# Patient Record
Sex: MALE | Race: WHITE | NOT HISPANIC OR LATINO | Employment: FULL TIME | URBAN - METROPOLITAN AREA
[De-identification: names, ages, dates, MRNs, and addresses within clinical notes are randomized per-mention and may not be internally consistent; named-entity substitution may affect disease eponyms.]

---

## 2020-09-29 ENCOUNTER — OFFICE VISIT (OUTPATIENT)
Dept: URGENT CARE | Facility: CLINIC | Age: 45
End: 2020-09-29
Payer: COMMERCIAL

## 2020-09-29 ENCOUNTER — TELEPHONE (OUTPATIENT)
Dept: ORTHOPEDICS | Facility: CLINIC | Age: 45
End: 2020-09-29

## 2020-09-29 VITALS
DIASTOLIC BLOOD PRESSURE: 66 MMHG | WEIGHT: 200 LBS | SYSTOLIC BLOOD PRESSURE: 133 MMHG | RESPIRATION RATE: 16 BRPM | OXYGEN SATURATION: 98 % | HEART RATE: 63 BPM | HEIGHT: 72 IN | BODY MASS INDEX: 27.09 KG/M2 | TEMPERATURE: 99 F

## 2020-09-29 DIAGNOSIS — M79.671 RIGHT FOOT PAIN: Primary | ICD-10-CM

## 2020-09-29 PROCEDURE — 73630 XR FOOT COMPLETE 3 VIEW RIGHT: ICD-10-PCS | Mod: RT,S$GLB,, | Performed by: RADIOLOGY

## 2020-09-29 PROCEDURE — 99203 PR OFFICE/OUTPT VISIT, NEW, LEVL III, 30-44 MIN: ICD-10-PCS | Mod: S$GLB,,, | Performed by: PHYSICIAN ASSISTANT

## 2020-09-29 PROCEDURE — 73630 X-RAY EXAM OF FOOT: CPT | Mod: RT,S$GLB,, | Performed by: RADIOLOGY

## 2020-09-29 PROCEDURE — 99203 OFFICE O/P NEW LOW 30 MIN: CPT | Mod: S$GLB,,, | Performed by: PHYSICIAN ASSISTANT

## 2020-09-29 NOTE — PROGRESS NOTES
"Subjective:       Patient ID: Jose Brock is a 45 y.o. male.    Vitals:  height is 6' (1.829 m) and weight is 90.7 kg (200 lb). His oral temperature is 98.6 °F (37 °C). His blood pressure is 133/66 and his pulse is 63. His respiration is 16 and oxygen saturation is 98%.     Chief Complaint: Foot Pain    Patient presents to urgent care with bilateral foot pain x 1 month. Patient reports that he was playing volleyball in the sand and "thinks he came down on his feet wrong". Patient denies prior injury/trauma to R/L foot. Patient reports L foot pain has improved, but R foot is still bothering him. Patient currently denies numbness/tingling or weakness. Patient reports that he has not been taking it easy and still exercising - Cross Fit, jump rope, etc.     Foot Pain  This is a new problem. The current episode started more than 1 month ago. The problem occurs constantly. The problem has been unchanged. Associated symptoms include arthralgias. Pertinent negatives include no abdominal pain, anorexia, change in bowel habit, chest pain, chills, congestion, coughing, diaphoresis, fatigue, fever, headaches, joint swelling, myalgias, nausea, neck pain, numbness, rash, sore throat, swollen glands, urinary symptoms, vertigo, visual change, vomiting or weakness. The symptoms are aggravated by walking (palpation). He has tried nothing for the symptoms.       Constitution: Negative for chills, sweating, fatigue and fever.   HENT: Negative for ear pain, drooling, congestion, sore throat, trouble swallowing and voice change.    Neck: Negative for neck pain, neck stiffness, painful lymph nodes and neck swelling.   Cardiovascular: Negative for chest pain, leg swelling, palpitations, sob on exertion and passing out.   Eyes: Negative for eye discharge, eye itching, eye pain, eye redness, double vision, blurred vision and eyelid swelling.   Respiratory: Negative for chest tightness, cough, sputum production, bloody sputum, shortness of " breath, stridor and wheezing.    Gastrointestinal: Negative for abdominal pain, abdominal bloating, nausea, vomiting, constipation, diarrhea and heartburn.   Musculoskeletal: Positive for pain, trauma, joint pain and pain with walking. Negative for joint swelling, abnormal ROM of joint, back pain, muscle cramps and muscle ache.   Skin: Negative for rash, wound, abrasion, laceration, puncture wound, erythema, bruising and hives.   Allergic/Immunologic: Negative for seasonal allergies, hives, itching and sneezing.   Neurological: Negative for dizziness, history of vertigo, light-headedness, passing out, loss of balance, headaches, altered mental status, loss of consciousness, numbness, tingling and seizures.   Hematologic/Lymphatic: Negative for swollen lymph nodes.   Psychiatric/Behavioral: Negative for altered mental status and nervous/anxious. The patient is not nervous/anxious.        Objective:      Physical Exam   Constitutional: He is oriented to person, place, and time. He appears well-developed. He is cooperative.  Non-toxic appearance. He does not appear ill. No distress.   HENT:   Head: Normocephalic and atraumatic.   Ears:   Right Ear: Hearing, tympanic membrane, external ear and ear canal normal.   Left Ear: Hearing, tympanic membrane, external ear and ear canal normal.   Nose: Nose normal. No mucosal edema, rhinorrhea or nasal deformity. No epistaxis. Right sinus exhibits no maxillary sinus tenderness and no frontal sinus tenderness. Left sinus exhibits no maxillary sinus tenderness and no frontal sinus tenderness.   Mouth/Throat: Uvula is midline, oropharynx is clear and moist and mucous membranes are normal. No trismus in the jaw. Normal dentition. No uvula swelling. No oropharyngeal exudate, posterior oropharyngeal edema or posterior oropharyngeal erythema.   Eyes: Conjunctivae and lids are normal. No scleral icterus.   Neck: Trachea normal, full passive range of motion without pain and phonation  normal. Neck supple. No neck rigidity. No edema and no erythema present.   Cardiovascular: Normal rate, regular rhythm, normal heart sounds and normal pulses.   Pulmonary/Chest: Effort normal and breath sounds normal. No accessory muscle usage. No respiratory distress. He has no decreased breath sounds. He has no rhonchi.   Abdominal: Normal appearance.   Musculoskeletal: Normal range of motion.         General: No deformity.      Right foot: Normal range of motion and normal capillary refill. Tenderness and bony tenderness present. No swelling, crepitus, deformity or laceration.      Left foot: Normal.      Comments: FROM and pain with R foot dorsiflexion/plantarflexion/inversion/eversion. 5/5 strength and full sensation bilateral. TTP over proximal phalanx of 2nd toe. 2+ radial pulses bilateral. No pitting edema to bilateral lower extremities. No numbness or tingling. Able to ambulate without difficulty.   Lymphadenopathy:     He has no cervical adenopathy.   Neurological: He is alert and oriented to person, place, and time. He has normal sensation. He exhibits normal muscle tone. Gait normal. Coordination normal.   Skin: Skin is warm, dry, intact, not diaphoretic, not pale and no rash. Capillary refill takes less than 2 seconds. not right footerythemaPsychiatric: His speech is normal and behavior is normal. Judgment and thought content normal.   Nursing note and vitals reviewed.    X-ray Foot Complete Right  Result Date: 9/29/2020  EXAMINATION: XR FOOT COMPLETE 3 VIEW RIGHT CLINICAL HISTORY: . Pain in right foot TECHNIQUE: AP, lateral, and oblique views of the right foot were performed. COMPARISON: None FINDINGS: Three views right foot. There is lucency in the region of the distal aspect of the proximal phalanx of the 2nd toe, not appreciated on the orthogonal views suggesting sequela of overlying skin fold.  No dislocation.  No radiopaque foreign body.     1. Irregular lucency involving the distal aspect of the  proximal phalanx of the 2nd toe is felt to reflect that of artifact from skin fold however correlation with any focal tenderness is recommended to definitively exclude fracture.  No findings to suggest dislocation. Electronically signed by: Elder Queen MD Date:    09/29/2020 Time:    14:04        Assessment:       1. Right foot pain        Plan:     Discussed XRAY with patient. Advised close follow-up with Ortho. Advised NWB with/without short walking boot due to possible fracture, but patient refused at this time. ER precautions given to patient. Patient aware, verbalized understanding and agreed with plan of care.    Right foot pain  -     X-Ray Foot Complete Right; Future; Expected date: 09/29/2020  -     Ambulatory referral/consult to Orthopedics      Patient Instructions     You must understand that you've received an Urgent Care treatment only and that you may be released before all your medical problems are known or treated. You, the patient, will arrange for follow up care as instructed.  Follow up with your PCP or specialty clinic as directed if not improved or as needed. You can call [phone] to schedule an appointment with the appropriate provider.  If your condition worsens we recommend that you receive another evaluation at the Emergency Department for any concerns or worsening of condition.  Patient aware and verbalized understanding.    Discussed XRAY results with patient.  Patient aware and verbalized understanding.    See XRAY report below:  X-ray Foot Complete Right  Result Date: 9/29/2020  EXAMINATION: XR FOOT COMPLETE 3 VIEW RIGHT CLINICAL HISTORY: . Pain in right foot TECHNIQUE: AP, lateral, and oblique views of the right foot were performed. COMPARISON: None FINDINGS: Three views right foot. There is lucency in the region of the distal aspect of the proximal phalanx of the 2nd toe, not appreciated on the orthogonal views suggesting sequela of overlying skin fold.  No dislocation.  No  radiopaque foreign body.     1. Irregular lucency involving the distal aspect of the proximal phalanx of the 2nd toe is felt to reflect that of artifact from skin fold however correlation with any focal tenderness is recommended to definitively exclude fracture.  No findings to suggest dislocation. Electronically signed by: Elder Queen MD Date:    09/29/2020 Time:     Rest, Ice, Compression and Elevation as discussed.  Advised NWB with or without short walking boot until further evaluated with Ortho.  Wear supportive shoes such as tennis shoes for better support/comfort.  Follow-up with PCP for further evaluation as needed.  Follow-up with Ortho for further evaluation as discussed.  Strict ER precautions given to patient.  Patient aware and verbalized understanding.  R.I.C.E.    R.I.C.E. stands for Rest, Ice, Compression, and Elevation. Doing these things helps limit pain and swelling after an injury. R.I.C.E. also helps injuries heal faster. Use R.I.C.E. for sprains, strains, and severe bruises or bumps. Follow the tips on this handout and begin R.I.C.E. as soon as possible after an injury.  ? Rest  Pain is your bodys way of telling you to rest an injured area. Whether you have hurt an elbow, hand, foot, or knee, limiting its use will prevent further injury and help you heal.  ? Ice  Applying ice right after an injury helps prevent swelling and reduce pain. Dont place ice directly on your skin.  · Wrap a cold pack or bag of ice in a thin cloth. Place it over the injured area.  · Ice for 10 minutes every 3 hours. Dont ice for more than 20 minutes at a time.  ? Compression  Putting pressure (compression) on an injury helps prevent swelling and provides support.  · Wrap the injured area firmly with an elastic bandage. If your hand or foot tingles, becomes discolored, or feels cold to the touch, the bandage may be too tight. Rewrap it more loosely.  · If your bandage becomes too loose, rewrap it.  · Do not wear  an elastic bandage overnight.  ? Elevation  Keeping an injury elevated helps reduce swelling, pain, and throbbing. Elevation is most effective when the injury is kept elevated higher than the heart.     Call your healthcare provider if you notice any of the following:  · Fingers or toes feel numb, are cold to the touch, or change color  · Skin looks shiny or tight  · Pain, swelling, or bruising worsens and is not improved with elevation   Date Last Reviewed: 9/3/2015  © 4207-3493 [employer]. [address] [place] [address], [place] [address]. All rights reserved. This information is not intended as a substitute for professional medical care. Always follow your healthcare professional's instructions.

## 2020-09-29 NOTE — PATIENT INSTRUCTIONS
You must understand that you've received an Urgent Care treatment only and that you may be released before all your medical problems are known or treated. You, the patient, will arrange for follow up care as instructed.  Follow up with your PCP or specialty clinic as directed if not improved or as needed. You can call [phone] to schedule an appointment with the appropriate provider.  If your condition worsens we recommend that you receive another evaluation at the Emergency Department for any concerns or worsening of condition.  Patient aware and verbalized understanding.    Discussed XRAY results with patient.  Patient aware and verbalized understanding.    See XRAY report below:  X-ray Foot Complete Right  Result Date: 9/29/2020  EXAMINATION: XR FOOT COMPLETE 3 VIEW RIGHT CLINICAL HISTORY: . Pain in right foot TECHNIQUE: AP, lateral, and oblique views of the right foot were performed. COMPARISON: None FINDINGS: Three views right foot. There is lucency in the region of the distal aspect of the proximal phalanx of the 2nd toe, not appreciated on the orthogonal views suggesting sequela of overlying skin fold.  No dislocation.  No radiopaque foreign body.     1. Irregular lucency involving the distal aspect of the proximal phalanx of the 2nd toe is felt to reflect that of artifact from skin fold however correlation with any focal tenderness is recommended to definitively exclude fracture.  No findings to suggest dislocation. Electronically signed by: Elder Queen MD Date:    09/29/2020 Time:     Rest, Ice, Compression and Elevation as discussed.  Advised NWB with or without short walking boot until further evaluated with Ortho.  Wear supportive shoes such as tennis shoes for better support/comfort.  Follow-up with PCP for further evaluation as needed.  Follow-up with Ortho for further evaluation as discussed.  Strict ER precautions given to patient.  Patient aware and verbalized  understanding.  R.I.C.E.    R.I.C.E. stands for Rest, Ice, Compression, and Elevation. Doing these things helps limit pain and swelling after an injury. R.I.C.E. also helps injuries heal faster. Use R.I.C.E. for sprains, strains, and severe bruises or bumps. Follow the tips on this handout and begin R.I.C.E. as soon as possible after an injury.  ? Rest  Pain is your bodys way of telling you to rest an injured area. Whether you have hurt an elbow, hand, foot, or knee, limiting its use will prevent further injury and help you heal.  ? Ice  Applying ice right after an injury helps prevent swelling and reduce pain. Dont place ice directly on your skin.  · Wrap a cold pack or bag of ice in a thin cloth. Place it over the injured area.  · Ice for 10 minutes every 3 hours. Dont ice for more than 20 minutes at a time.  ? Compression  Putting pressure (compression) on an injury helps prevent swelling and provides support.  · Wrap the injured area firmly with an elastic bandage. If your hand or foot tingles, becomes discolored, or feels cold to the touch, the bandage may be too tight. Rewrap it more loosely.  · If your bandage becomes too loose, rewrap it.  · Do not wear an elastic bandage overnight.  ? Elevation  Keeping an injury elevated helps reduce swelling, pain, and throbbing. Elevation is most effective when the injury is kept elevated higher than the heart.     Call your healthcare provider if you notice any of the following:  · Fingers or toes feel numb, are cold to the touch, or change color  · Skin looks shiny or tight  · Pain, swelling, or bruising worsens and is not improved with elevation   Date Last Reviewed: 9/3/2015  © 0745-9935 [employer]. [address] [place] [address], [place] [address]. All rights reserved. This information is not intended as a substitute for professional medical care. Always follow your healthcare professional's instructions.

## 2020-10-22 DIAGNOSIS — M79.672 PAIN IN BOTH FEET: Primary | ICD-10-CM

## 2020-10-22 DIAGNOSIS — M79.671 PAIN IN BOTH FEET: Primary | ICD-10-CM

## 2020-10-23 ENCOUNTER — HOSPITAL ENCOUNTER (OUTPATIENT)
Dept: RADIOLOGY | Facility: HOSPITAL | Age: 45
Discharge: HOME | End: 2020-10-23
Attending: ORTHOPAEDIC SURGERY
Payer: COMMERCIAL

## 2020-10-23 ENCOUNTER — OFFICE VISIT (OUTPATIENT)
Dept: ORTHOPEDICS | Facility: CLINIC | Age: 45
End: 2020-10-23
Payer: COMMERCIAL

## 2020-10-23 VITALS
BODY MASS INDEX: 27.08 KG/M2 | DIASTOLIC BLOOD PRESSURE: 66 MMHG | WEIGHT: 199.94 LBS | SYSTOLIC BLOOD PRESSURE: 133 MMHG | HEART RATE: 62 BPM | HEIGHT: 72 IN

## 2020-10-23 DIAGNOSIS — M79.671 PAIN IN BOTH FEET: ICD-10-CM

## 2020-10-23 DIAGNOSIS — M77.42 METATARSALGIA OF BOTH FEET: ICD-10-CM

## 2020-10-23 DIAGNOSIS — M79.672 PAIN IN BOTH FEET: ICD-10-CM

## 2020-10-23 DIAGNOSIS — M77.41 METATARSALGIA OF BOTH FEET: ICD-10-CM

## 2020-10-23 DIAGNOSIS — G57.63 MORTON'S NEUROMA OF SECOND INTERSPACES OF BOTH FEET: Primary | ICD-10-CM

## 2020-10-23 PROCEDURE — 64455 NJX AA&/STRD PLTR COM DG NRV: CPT | Mod: S$GLB,,, | Performed by: ORTHOPAEDIC SURGERY

## 2020-10-23 PROCEDURE — 73630 X-RAY EXAM OF FOOT: CPT | Mod: 26,,, | Performed by: RADIOLOGY

## 2020-10-23 PROCEDURE — 99999 PR PBB SHADOW E&M-EST. PATIENT-LVL III: ICD-10-PCS | Mod: PBBFAC,,, | Performed by: ORTHOPAEDIC SURGERY

## 2020-10-23 PROCEDURE — 99999 PR PBB SHADOW E&M-EST. PATIENT-LVL III: CPT | Mod: PBBFAC,,, | Performed by: ORTHOPAEDIC SURGERY

## 2020-10-23 PROCEDURE — 99244 PR OFFICE CONSULTATION,LEVEL IV: ICD-10-PCS | Mod: 25,S$GLB,, | Performed by: ORTHOPAEDIC SURGERY

## 2020-10-23 PROCEDURE — 99244 OFF/OP CNSLTJ NEW/EST MOD 40: CPT | Mod: 25,S$GLB,, | Performed by: ORTHOPAEDIC SURGERY

## 2020-10-23 PROCEDURE — 73630 X-RAY EXAM OF FOOT: CPT | Mod: TC,50,PN

## 2020-10-23 PROCEDURE — 73630 XR FOOT COMPLETE 3 VIEW BILATERAL: ICD-10-PCS | Mod: 26,,, | Performed by: RADIOLOGY

## 2020-10-23 PROCEDURE — 64455 PR INJECT ANES/STEROID PLANTAR COMMON DIGITAL NERVE: ICD-10-PCS | Mod: S$GLB,,, | Performed by: ORTHOPAEDIC SURGERY

## 2020-10-23 NOTE — LETTER
October 27, 2020      Jarvis Eden II, MD  [address] [place] [address] [address] [place] [address]           [facility]  [address] [place] [address] [address]  [place] [address]  Phone: [phone]          Patient: Jose Brock   MR Number: [id number]   YOB: 1975   Date of Visit: 10/23/2020       Dear Dr. Jarvis Eden II:    Thank you for referring Jose Brock to me for evaluation. Attached you will find relevant portions of my assessment and plan of care.    If you have questions, please do not hesitate to call me. I look forward to following Jose Brock along with you.    Sincerely,    Trey Garcia MD    Enclosure  CC:  No Recipients    If you would like to receive this communication electronically, please contact [email or web address] or [phone] to request more information on [facility] [employer] access.    For providers and/or their staff who would like to refer a patient to [facility], please contact us through our one-stop-shop provider referral line, Peter Pittman, at [phone].    If you feel you have received this communication in error or would no longer like to receive these types of communications, please e-mail [email or web address]

## 2020-10-27 RX ORDER — TRIAMCINOLONE ACETONIDE 40 MG/ML
40 INJECTION, SUSPENSION INTRA-ARTICULAR; INTRAMUSCULAR
Status: COMPLETED | OUTPATIENT
Start: 2020-10-27 | End: 2020-10-27

## 2020-10-27 RX ADMIN — TRIAMCINOLONE ACETONIDE 40 MG: 40 INJECTION, SUSPENSION INTRA-ARTICULAR; INTRAMUSCULAR at 08:10

## 2020-10-27 NOTE — PROGRESS NOTES
HPI: Jose Brock is a 45 y.o. male who was referred to me by Dr. Lorne Eden and was seen in consultation today for bilateral foot pain. The pain has been present since mid August when he was playing beach volleyball barefooted and he thinks he landed on a rock in the sand. He is also very active in Crossfit.   He rates his pain as 5/10 today. He his here with his wife today.   The pain is worse with walking and standing.  He does have cramping in the forefoot. Pt denies weakness, numbness, and tingling.       PAST MEDICAL/SURGICAL/FAMILY/SOCIAL/ HISTORY: REVIEWED    ALLERGIES/MEDICATIONS: REVIEWED       Review of Systems:     Constitution: Negative.   HEENT: Negative.   Eyes: Negative.   Cardiovascular: Negative.   Respiratory: Negative.   Endocrine: Negative.   Hematologic/Lymphatic: Negative.   Skin: Negative.   Musculoskeletal: See HPI  Gastrointestinal: Negative.   Genitourinary: Negative.   Neurological: Negative.   Psychiatric/Behavioral: Negative.   Allergic/Immunologic: Negative.       PHYSICAL EXAM:  Vitals:    10/23/20 1111   BP: 133/66   Pulse: 62     Ht Readings from Last 1 Encounters:   10/23/20 6' (1.829 m)     Wt Readings from Last 1 Encounters:   10/23/20 90.7 kg (199 lb 15.3 oz)         GENERAL: Well developed, well nourished, no acute distress.  Very pleasant.   SKIN: Skin is intact. No atrophy, abrasions or lesions are noted.   Neurological: Normal mental status. Appropriate and conversant. Alert and oriented x 3.  GAIT: Walks with non-antalgic gait.    Bilateral lower extremities:  2+ dorsalis pedis pulse.  Capillary refill < 3 seconds.  Normal range of motion tibiotalar and subtalar joints. Normal alignment of the forefoot and the hindfoot.  5/5 strength EHL, FHL, tibialis anterior, gastrocsoleus, tibialis posterior and peroneals. Sensation to light touch intact sural, saphenous, superficial peroneal and deep peroneal nerves. No swelling, ecchymosis or deformity. No lymphadenopathy, no masses  or tumors palpated.   tenderness to palpation under 2nd metatarsal head at the 2nd webspaces R>L. + jerod's test.       XRAYS:   3 views of bilateral feet obtained and reviewed today reveal No evidence of fractures or dislocations. 2nd metatarsal longer than first.       ASSESSMENT:        Encounter Diagnoses   Name Primary?   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 More than half the time was spent counseling the patient on his condition and the options for operative versus non-operative care.  I injected both feet today for hunt's neuroma. I gave him a handout on hunt's neuroma and to order metatarsal pads. I can repeat injections every 4-6 months or prn as needed.   We also discussed weil metatarsal shortening osteotomy and excision of hunt's neuroma at length and in detail.     Brief Procedure: Verbal consent for injection was obtained.  Using sterile technique a steroid injection was performed at the Right foot 2nd webspace using 1ml of 1% plain Lidocaine, 1ml of 0.25% Marcaine plain, and1ml of Kenalog. This was well tolerated and bandaid was placed. Post-injection instructions and expectations were given to the patient.    Verbal consent for injection was obtained.  Using sterile technique a steroid injection was performed at the Left foot 2nd webspace using 1ml of 1% plain Lidocaine, 1ml of 0.25% Marcaine plain, and1ml of Kenalog. This was well tolerated and bandaid was placed. Post-injection instructions and expectations were given to the patient.

## 2021-02-11 ENCOUNTER — HOSPITAL ENCOUNTER (OUTPATIENT)
Dept: RADIOLOGY | Facility: CLINIC | Age: 46
Discharge: HOME | End: 2021-02-11
Attending: NURSE PRACTITIONER
Payer: COMMERCIAL

## 2021-02-11 ENCOUNTER — OFFICE VISIT (OUTPATIENT)
Dept: UROLOGY | Facility: CLINIC | Age: 46
End: 2021-02-11
Payer: COMMERCIAL

## 2021-02-11 VITALS
SYSTOLIC BLOOD PRESSURE: 141 MMHG | HEIGHT: 73 IN | DIASTOLIC BLOOD PRESSURE: 75 MMHG | BODY MASS INDEX: 26.51 KG/M2 | WEIGHT: 200 LBS

## 2021-02-11 DIAGNOSIS — N20.0 RENAL STONE: ICD-10-CM

## 2021-02-11 DIAGNOSIS — N20.0 RENAL STONE: Primary | ICD-10-CM

## 2021-02-11 PROCEDURE — 99204 OFFICE O/P NEW MOD 45 MIN: CPT | Mod: S$GLB,,, | Performed by: NURSE PRACTITIONER

## 2021-02-11 PROCEDURE — 99204 PR OFFICE/OUTPT VISIT, NEW, LEVL IV, 45-59 MIN: ICD-10-PCS | Mod: S$GLB,,, | Performed by: NURSE PRACTITIONER

## 2021-02-11 PROCEDURE — 74018 XR ABDOMEN AP 1 VIEW: ICD-10-PCS | Mod: TC,,, | Performed by: UROLOGY

## 2021-02-11 PROCEDURE — 74018 XR ABDOMEN AP 1 VIEW: ICD-10-PCS | Mod: 26,,, | Performed by: RADIOLOGY

## 2021-02-11 PROCEDURE — 3008F BODY MASS INDEX DOCD: CPT | Mod: CPTII,S$GLB,, | Performed by: NURSE PRACTITIONER

## 2021-02-11 PROCEDURE — 74018 RADEX ABDOMEN 1 VIEW: CPT | Mod: 26,,, | Performed by: RADIOLOGY

## 2021-02-11 PROCEDURE — 3008F PR BODY MASS INDEX (BMI) DOCUMENTED: ICD-10-PCS | Mod: CPTII,S$GLB,, | Performed by: NURSE PRACTITIONER

## 2021-02-11 PROCEDURE — 74018 RADEX ABDOMEN 1 VIEW: CPT | Mod: TC,,, | Performed by: UROLOGY

## 2021-02-11 RX ORDER — KETOROLAC TROMETHAMINE 10 MG/1
TABLET, FILM COATED ORAL
COMMUNITY
Start: 2021-02-08 | End: 2021-03-02

## 2021-02-11 RX ORDER — ONDANSETRON 4 MG/1
TABLET, ORALLY DISINTEGRATING ORAL
COMMUNITY
Start: 2021-02-08 | End: 2021-03-02

## 2021-02-11 RX ORDER — HYDROCODONE BITARTRATE AND ACETAMINOPHEN 10; 325 MG/1; MG/1
TABLET ORAL
COMMUNITY
Start: 2021-02-08 | End: 2021-08-03

## 2021-02-11 RX ORDER — TAMSULOSIN HYDROCHLORIDE 0.4 MG/1
0.8 CAPSULE ORAL DAILY
Qty: 15 CAPSULE | Refills: 0 | Status: SHIPPED | OUTPATIENT
Start: 2021-02-11 | End: 2021-03-02

## 2021-02-11 RX ORDER — TAMSULOSIN HYDROCHLORIDE 0.4 MG/1
CAPSULE ORAL
COMMUNITY
Start: 2021-02-08 | End: 2021-03-02

## 2021-03-02 ENCOUNTER — OFFICE VISIT (OUTPATIENT)
Dept: UROLOGY | Facility: CLINIC | Age: 46
End: 2021-03-02
Payer: COMMERCIAL

## 2021-03-02 VITALS
SYSTOLIC BLOOD PRESSURE: 131 MMHG | RESPIRATION RATE: 18 BRPM | WEIGHT: 200 LBS | HEIGHT: 73 IN | DIASTOLIC BLOOD PRESSURE: 79 MMHG | HEART RATE: 58 BPM | BODY MASS INDEX: 26.51 KG/M2

## 2021-03-02 DIAGNOSIS — R35.0 URINARY FREQUENCY: Primary | ICD-10-CM

## 2021-03-02 LAB — POC RESIDUAL URINE VOLUME: 87 ML (ref 0–100)

## 2021-03-02 PROCEDURE — 99214 PR OFFICE/OUTPT VISIT, EST, LEVL IV, 30-39 MIN: ICD-10-PCS | Mod: 25,S$GLB,, | Performed by: NURSE PRACTITIONER

## 2021-03-02 PROCEDURE — 81001 URINALYSIS AUTO W/SCOPE: CPT | Mod: S$GLB,,, | Performed by: NURSE PRACTITIONER

## 2021-03-02 PROCEDURE — 99214 OFFICE O/P EST MOD 30 MIN: CPT | Mod: 25,S$GLB,, | Performed by: NURSE PRACTITIONER

## 2021-03-02 PROCEDURE — 3008F BODY MASS INDEX DOCD: CPT | Mod: CPTII,S$GLB,, | Performed by: NURSE PRACTITIONER

## 2021-03-02 PROCEDURE — 51798 POCT BLADDER SCAN: ICD-10-PCS | Mod: S$GLB,,, | Performed by: NURSE PRACTITIONER

## 2021-03-02 PROCEDURE — 81001 PR  URINALYSIS, AUTO, W/SCOPE: ICD-10-PCS | Mod: S$GLB,,, | Performed by: NURSE PRACTITIONER

## 2021-03-02 PROCEDURE — 51798 US URINE CAPACITY MEASURE: CPT | Mod: S$GLB,,, | Performed by: NURSE PRACTITIONER

## 2021-03-02 PROCEDURE — 3008F PR BODY MASS INDEX (BMI) DOCUMENTED: ICD-10-PCS | Mod: CPTII,S$GLB,, | Performed by: NURSE PRACTITIONER

## 2021-03-02 RX ORDER — TAMSULOSIN HYDROCHLORIDE 0.4 MG/1
0.4 CAPSULE ORAL DAILY
Qty: 21 CAPSULE | Refills: 0 | Status: SHIPPED | OUTPATIENT
Start: 2021-03-02 | End: 2021-03-15

## 2021-03-02 RX ORDER — CIPROFLOXACIN 500 MG/1
500 TABLET ORAL 2 TIMES DAILY
Qty: 42 TABLET | Refills: 0 | Status: SHIPPED | OUTPATIENT
Start: 2021-03-02 | End: 2021-03-23

## 2021-05-12 ENCOUNTER — PATIENT MESSAGE (OUTPATIENT)
Dept: RESEARCH | Facility: HOSPITAL | Age: 46
End: 2021-05-12

## 2021-07-12 ENCOUNTER — TELEPHONE (OUTPATIENT)
Dept: ORTHOPEDICS | Facility: CLINIC | Age: 46
End: 2021-07-12

## 2021-07-29 ENCOUNTER — OFFICE VISIT (OUTPATIENT)
Dept: ORTHOPEDICS | Facility: CLINIC | Age: 46
End: 2021-07-29
Payer: COMMERCIAL

## 2021-07-29 VITALS
WEIGHT: 199.94 LBS | HEIGHT: 73 IN | DIASTOLIC BLOOD PRESSURE: 78 MMHG | HEART RATE: 65 BPM | SYSTOLIC BLOOD PRESSURE: 135 MMHG | BODY MASS INDEX: 26.5 KG/M2

## 2021-07-29 DIAGNOSIS — G57.63 MORTON'S NEUROMA OF SECOND INTERSPACES OF BOTH FEET: Primary | ICD-10-CM

## 2021-07-29 PROCEDURE — 99999 PR PBB SHADOW E&M-EST. PATIENT-LVL III: ICD-10-PCS | Mod: PBBFAC,,, | Performed by: ORTHOPAEDIC SURGERY

## 2021-07-29 PROCEDURE — 3075F SYST BP GE 130 - 139MM HG: CPT | Mod: CPTII,S$GLB,, | Performed by: ORTHOPAEDIC SURGERY

## 2021-07-29 PROCEDURE — 99999 PR PBB SHADOW E&M-EST. PATIENT-LVL III: CPT | Mod: PBBFAC,,, | Performed by: ORTHOPAEDIC SURGERY

## 2021-07-29 PROCEDURE — 3008F PR BODY MASS INDEX (BMI) DOCUMENTED: ICD-10-PCS | Mod: CPTII,S$GLB,, | Performed by: ORTHOPAEDIC SURGERY

## 2021-07-29 PROCEDURE — 1125F PR PAIN SEVERITY QUANTIFIED, PAIN PRESENT: ICD-10-PCS | Mod: CPTII,S$GLB,, | Performed by: ORTHOPAEDIC SURGERY

## 2021-07-29 PROCEDURE — 99214 PR OFFICE/OUTPT VISIT, EST, LEVL IV, 30-39 MIN: ICD-10-PCS | Mod: 25,S$GLB,, | Performed by: ORTHOPAEDIC SURGERY

## 2021-07-29 PROCEDURE — 3078F DIAST BP <80 MM HG: CPT | Mod: CPTII,S$GLB,, | Performed by: ORTHOPAEDIC SURGERY

## 2021-07-29 PROCEDURE — 1160F PR REVIEW ALL MEDS BY PRESCRIBER/CLIN PHARMACIST DOCUMENTED: ICD-10-PCS | Mod: CPTII,S$GLB,, | Performed by: ORTHOPAEDIC SURGERY

## 2021-07-29 PROCEDURE — 1159F MED LIST DOCD IN RCRD: CPT | Mod: CPTII,S$GLB,, | Performed by: ORTHOPAEDIC SURGERY

## 2021-07-29 PROCEDURE — 99214 OFFICE O/P EST MOD 30 MIN: CPT | Mod: 25,S$GLB,, | Performed by: ORTHOPAEDIC SURGERY

## 2021-07-29 PROCEDURE — 1159F PR MEDICATION LIST DOCUMENTED IN MEDICAL RECORD: ICD-10-PCS | Mod: CPTII,S$GLB,, | Performed by: ORTHOPAEDIC SURGERY

## 2021-07-29 PROCEDURE — 64455 NJX AA&/STRD PLTR COM DG NRV: CPT | Mod: 50,S$GLB,, | Performed by: ORTHOPAEDIC SURGERY

## 2021-07-29 PROCEDURE — 3078F PR MOST RECENT DIASTOLIC BLOOD PRESSURE < 80 MM HG: ICD-10-PCS | Mod: CPTII,S$GLB,, | Performed by: ORTHOPAEDIC SURGERY

## 2021-07-29 PROCEDURE — 64455 PR INJECT ANES/STEROID PLANTAR COMMON DIGITAL NERVE: ICD-10-PCS | Mod: 50,S$GLB,, | Performed by: ORTHOPAEDIC SURGERY

## 2021-07-29 PROCEDURE — 3008F BODY MASS INDEX DOCD: CPT | Mod: CPTII,S$GLB,, | Performed by: ORTHOPAEDIC SURGERY

## 2021-07-29 PROCEDURE — 1160F RVW MEDS BY RX/DR IN RCRD: CPT | Mod: CPTII,S$GLB,, | Performed by: ORTHOPAEDIC SURGERY

## 2021-07-29 PROCEDURE — 3075F PR MOST RECENT SYSTOLIC BLOOD PRESS GE 130-139MM HG: ICD-10-PCS | Mod: CPTII,S$GLB,, | Performed by: ORTHOPAEDIC SURGERY

## 2021-07-29 PROCEDURE — 1125F AMNT PAIN NOTED PAIN PRSNT: CPT | Mod: CPTII,S$GLB,, | Performed by: ORTHOPAEDIC SURGERY

## 2021-07-29 RX ADMIN — TRIAMCINOLONE ACETONIDE 40 MG: 40 INJECTION, SUSPENSION INTRA-ARTICULAR; INTRAMUSCULAR at 08:07

## 2021-07-29 RX ADMIN — TRIAMCINOLONE ACETONIDE 40 MG: 40 INJECTION, SUSPENSION INTRA-ARTICULAR; INTRAMUSCULAR at 10:07

## 2021-08-03 RX ORDER — TRIAMCINOLONE ACETONIDE 40 MG/ML
40 INJECTION, SUSPENSION INTRA-ARTICULAR; INTRAMUSCULAR
Status: COMPLETED | OUTPATIENT
Start: 2021-08-03 | End: 2021-07-29

## 2021-12-06 ENCOUNTER — TELEPHONE (OUTPATIENT)
Dept: ORTHOPEDICS | Facility: CLINIC | Age: 46
End: 2021-12-06
Payer: COMMERCIAL

## 2022-05-05 PROBLEM — S73.192A ACETABULAR LABRUM TEAR, LEFT, INITIAL ENCOUNTER: Status: ACTIVE | Noted: 2022-05-05

## 2022-12-28 ENCOUNTER — OFFICE VISIT (OUTPATIENT)
Dept: UROLOGY | Facility: CLINIC | Age: 47
End: 2022-12-28
Payer: COMMERCIAL

## 2022-12-28 DIAGNOSIS — R35.0 URINARY FREQUENCY: Primary | ICD-10-CM

## 2022-12-28 LAB — POC RESIDUAL URINE VOLUME: 12 ML (ref 0–100)

## 2022-12-28 PROCEDURE — 1160F RVW MEDS BY RX/DR IN RCRD: CPT | Mod: CPTII,S$GLB,, | Performed by: NURSE PRACTITIONER

## 2022-12-28 PROCEDURE — 99214 OFFICE O/P EST MOD 30 MIN: CPT | Mod: S$GLB,,, | Performed by: NURSE PRACTITIONER

## 2022-12-28 PROCEDURE — 1159F MED LIST DOCD IN RCRD: CPT | Mod: CPTII,S$GLB,, | Performed by: NURSE PRACTITIONER

## 2022-12-28 PROCEDURE — 51798 POCT BLADDER SCAN: ICD-10-PCS | Mod: S$GLB,,, | Performed by: NURSE PRACTITIONER

## 2022-12-28 PROCEDURE — 51798 US URINE CAPACITY MEASURE: CPT | Mod: S$GLB,,, | Performed by: NURSE PRACTITIONER

## 2022-12-28 PROCEDURE — 99214 PR OFFICE/OUTPT VISIT, EST, LEVL IV, 30-39 MIN: ICD-10-PCS | Mod: S$GLB,,, | Performed by: NURSE PRACTITIONER

## 2022-12-28 PROCEDURE — 1159F PR MEDICATION LIST DOCUMENTED IN MEDICAL RECORD: ICD-10-PCS | Mod: CPTII,S$GLB,, | Performed by: NURSE PRACTITIONER

## 2022-12-28 PROCEDURE — 1160F PR REVIEW ALL MEDS BY PRESCRIBER/CLIN PHARMACIST DOCUMENTED: ICD-10-PCS | Mod: CPTII,S$GLB,, | Performed by: NURSE PRACTITIONER

## 2022-12-28 RX ORDER — TESTOSTERONE CYPIONATE 200 MG/ML
INJECTION, SOLUTION INTRAMUSCULAR
COMMUNITY

## 2022-12-28 NOTE — PROGRESS NOTES
Subjective:       Patient ID: Jose Brock is a 47 y.o. male.    Chief Complaint: Urinary Frequency      HPI: 47-year-old male known to service presents today with complaint of nocturnal urinary frequency voiding small volumes x5 - 6 each night.  Patient states his disruptive to his sleep pattern.  No pain with urination no dysuria, incontinence or gross hematuria.  No recurrence symptoms of kidney stone.  Past history of prostatitis no symptoms of prostate date infection.  Denies pain with ejaculation or blood in the semen.  No constipation.  Minimal p.m. fluid intake, no carbonated beverages in the p.m..       Past Medical History:   Past Medical History:   Diagnosis Date    Hypogonadism in male     Renal stone     Renal stone        Past Surgical Historical:   Past Surgical History:   Procedure Laterality Date    BACK SURGERY      CYSTOSCOPY      KNEE ARTHROSCOPY W/ MENISCAL REPAIR Left 2005    Dr. Bernstein        Medications:   Medication List with Changes/Refills   Current Medications    TESTOSTERONE CYPIONATE (DEPOTESTOTERONE CYPIONATE) 200 MG/ML INJECTION    Inject into the muscle every 14 (fourteen) days.        Past Social History:   Social History     Socioeconomic History    Marital status:    Tobacco Use    Smoking status: Never    Smokeless tobacco: Never   Substance and Sexual Activity    Alcohol use: Not Currently       Allergies: Review of patient's allergies indicates:  No Known Allergies     Family History: History reviewed. No pertinent family history.     Review of Systems:  Review of Systems   Constitutional:  Negative for activity change and appetite change.   HENT:  Negative for congestion and dental problem.    Eyes:  Negative for visual disturbance.   Respiratory:  Negative for chest tightness and shortness of breath.    Cardiovascular:  Negative for chest pain.   Gastrointestinal:  Negative for abdominal distention and abdominal pain.   Genitourinary:  Positive for frequency. Negative  for decreased urine volume, difficulty urinating, dysuria, enuresis, flank pain, genital sores, hematuria, penile discharge, penile pain, penile swelling, scrotal swelling, testicular pain and urgency.   Musculoskeletal:  Negative for back pain and neck pain.   Skin:  Negative for color change.   Neurological:  Negative for dizziness.   Hematological:  Negative for adenopathy.   Psychiatric/Behavioral:  Negative for agitation, behavioral problems and confusion.      Physical Exam:  Physical Exam  Constitutional:       Appearance: He is well-developed.   HENT:      Head: Normocephalic.   Eyes:      General: No scleral icterus.  Pulmonary:      Effort: Pulmonary effort is normal.      Breath sounds: Normal breath sounds.   Abdominal:      General: There is no distension.      Palpations: Abdomen is soft.      Tenderness: There is no abdominal tenderness.      Hernia: No hernia is present. There is no hernia in the right inguinal area or left inguinal area.   Genitourinary:     Penis: Normal.       Testes: Normal. Cremasteric reflex is present.   Musculoskeletal:      Cervical back: Normal range of motion.   Skin:     General: Skin is warm and dry.   Neurological:      Mental Status: He is alert and oriented to person, place, and time.       Assessment/Plan:    Nocturnal urinary frequency voiding small volumes--urinalysis negative, PVR 12 mL.  Trial myrbetriq 50 mg, 1 p.o. 1-1-1/2 hour prior to bedtime.  One month sample given.  Patient will call results.  Problem List Items Addressed This Visit    None  Visit Diagnoses       Urinary frequency    -  Primary    Relevant Orders    POCT Urinalysis (w/Micro Option)    POCT Bladder Scan

## 2023-01-16 ENCOUNTER — PATIENT MESSAGE (OUTPATIENT)
Dept: UROLOGY | Facility: CLINIC | Age: 48
End: 2023-01-16
Payer: COMMERCIAL

## 2023-01-17 RX ORDER — SOLIFENACIN SUCCINATE 10 MG/1
10 TABLET, FILM COATED ORAL NIGHTLY
Qty: 30 TABLET | Refills: 11 | Status: SHIPPED | OUTPATIENT
Start: 2023-01-17 | End: 2023-02-14

## 2023-02-13 ENCOUNTER — PATIENT MESSAGE (OUTPATIENT)
Dept: UROLOGY | Facility: CLINIC | Age: 48
End: 2023-02-13
Payer: COMMERCIAL

## 2023-02-14 ENCOUNTER — TELEPHONE (OUTPATIENT)
Dept: UROLOGY | Facility: CLINIC | Age: 48
End: 2023-02-14
Payer: COMMERCIAL

## 2023-02-14 RX ORDER — TAMSULOSIN HYDROCHLORIDE 0.4 MG/1
0.4 CAPSULE ORAL DAILY
Qty: 30 CAPSULE | Refills: 11 | Status: SHIPPED | OUTPATIENT
Start: 2023-02-14 | End: 2023-05-29 | Stop reason: SDUPTHER

## 2023-02-14 NOTE — TELEPHONE ENCOUNTER
Patients current Rx of Solifenacin Succinate 10 mg has not made any differences with his night time urination frequency. he is still waking up every 2 hours.  He scheduled an appointment with his GP and he scheduled and ultrasound of his kidneys.  The ultrasound results came back normal.  What do you recommend his next steps should be?

## 2023-02-16 NOTE — TELEPHONE ENCOUNTER
Called patient and instructed to stop Vesicare and start Tamsulosin that was called to pharmacy. Patient verbalized understanding.

## 2023-05-30 RX ORDER — TAMSULOSIN HYDROCHLORIDE 0.4 MG/1
0.4 CAPSULE ORAL DAILY
Qty: 30 CAPSULE | Refills: 11 | Status: SHIPPED | OUTPATIENT
Start: 2023-05-30 | End: 2024-05-29